# Patient Record
Sex: FEMALE | Race: WHITE | NOT HISPANIC OR LATINO | Employment: OTHER | URBAN - NONMETROPOLITAN AREA
[De-identification: names, ages, dates, MRNs, and addresses within clinical notes are randomized per-mention and may not be internally consistent; named-entity substitution may affect disease eponyms.]

---

## 2023-01-30 ENCOUNTER — OFFICE VISIT (OUTPATIENT)
Dept: CARDIOLOGY | Facility: CLINIC | Age: 58
End: 2023-01-30
Payer: MEDICARE

## 2023-01-30 VITALS
OXYGEN SATURATION: 97 % | SYSTOLIC BLOOD PRESSURE: 118 MMHG | WEIGHT: 156 LBS | DIASTOLIC BLOOD PRESSURE: 76 MMHG | HEIGHT: 63 IN | BODY MASS INDEX: 27.64 KG/M2 | HEART RATE: 93 BPM

## 2023-01-30 DIAGNOSIS — R07.9 CHEST PAIN, UNSPECIFIED TYPE: Primary | ICD-10-CM

## 2023-01-30 DIAGNOSIS — E78.5 DYSLIPIDEMIA: ICD-10-CM

## 2023-01-30 DIAGNOSIS — R06.02 SHORTNESS OF BREATH: ICD-10-CM

## 2023-01-30 PROCEDURE — 99204 OFFICE O/P NEW MOD 45 MIN: CPT | Performed by: PHYSICIAN ASSISTANT

## 2023-01-30 RX ORDER — TIZANIDINE 4 MG/1
TABLET ORAL
COMMUNITY
Start: 2023-01-14

## 2023-01-30 RX ORDER — SIMVASTATIN 40 MG
1 TABLET ORAL DAILY
COMMUNITY
Start: 2023-01-14

## 2023-01-30 RX ORDER — ONDANSETRON 4 MG/1
TABLET, FILM COATED ORAL
COMMUNITY
Start: 2023-01-18

## 2023-01-30 RX ORDER — VALACYCLOVIR HYDROCHLORIDE 500 MG/1
1 TABLET, FILM COATED ORAL 3 TIMES DAILY
COMMUNITY
Start: 2023-01-25

## 2023-01-30 RX ORDER — PANTOPRAZOLE SODIUM 40 MG/1
1 TABLET, DELAYED RELEASE ORAL DAILY
COMMUNITY
Start: 2023-01-14

## 2023-01-30 RX ORDER — OXYCODONE AND ACETAMINOPHEN 10; 325 MG/1; MG/1
TABLET ORAL
COMMUNITY
Start: 2023-01-30

## 2023-01-30 RX ORDER — BENZONATATE 200 MG/1
200 CAPSULE ORAL 3 TIMES DAILY PRN
COMMUNITY
Start: 2022-11-28

## 2023-01-30 RX ORDER — AMOXICILLIN AND CLAVULANATE POTASSIUM 500; 125 MG/1; MG/1
1 TABLET, FILM COATED ORAL EVERY 12 HOURS SCHEDULED
COMMUNITY
Start: 2022-11-28

## 2023-01-30 RX ORDER — PREDNISONE 20 MG/1
1 TABLET ORAL DAILY
COMMUNITY
Start: 2023-01-18

## 2023-01-30 RX ORDER — CYCLOBENZAPRINE HCL 10 MG
1 TABLET ORAL 3 TIMES DAILY
COMMUNITY
Start: 2023-01-18

## 2023-01-30 RX ORDER — LINACLOTIDE 290 UG/1
1 CAPSULE, GELATIN COATED ORAL DAILY
COMMUNITY
Start: 2023-01-14

## 2023-01-30 RX ORDER — NITROGLYCERIN 0.4 MG/1
TABLET SUBLINGUAL
Qty: 25 TABLET | Refills: 11 | Status: SHIPPED | OUTPATIENT
Start: 2023-01-30

## 2023-01-30 NOTE — PROGRESS NOTES
"Problem list     Subjective   Ros Corona is a 57 y.o. female     Chief Complaint   Patient presents with   • Cerebral Palsy     Cp and abn ekg       HPI    Patient is a 57-year-old female who presents to the office to be evaluated.  She has no previous history of coronary disease or structural heart disease    Recently, she has began noticing feeling substernal discomfort.  She describes it feeling as if something is \"sitting on her chest\".  This seems to be brought on by stressful or anxiety situations.  Whenever she feels stressed or becomes very anxious, she will then feel this discomfort.  She does not describe it when exerting or doing activity but mainly during the situations.  She will relax and rest and it resolves.    Patient does not feel severe shortness of breath.  She can become dyspneic if she was to climb a flight of stairs but on a flat surface, she does not describe any severe exertional shortness of breath or any dyspnea at all.    Patient does not describe PND or orthopnea.    She does not describe palpitating nor does she complain of dysrhythmic symptoms.  She otherwise has done well.  She recently went to her PCP and apparently had an abnormal EKG and was referred to have cardiac evaluation.      Current Outpatient Medications on File Prior to Visit   Medication Sig Dispense Refill   • amoxicillin-clavulanate (AUGMENTIN) 500-125 MG per tablet Take 1 tablet by mouth Every 12 (Twelve) Hours.     • benzonatate (TESSALON) 200 MG capsule Take 200 mg by mouth 3 (Three) Times a Day As Needed.     • cyclobenzaprine (FLEXERIL) 10 MG tablet Take 1 tablet by mouth 3 (Three) Times a Day.     • Linzess 290 MCG capsule capsule Take 1 capsule by mouth Daily.     • ondansetron (ZOFRAN) 4 MG tablet TAKE ONE TABLET BY MOUTH EVERY 4-6 HOURS AS NEEDED     • oxyCODONE-acetaminophen (PERCOCET)  MG per tablet      • pantoprazole (PROTONIX) 40 MG EC tablet Take 1 tablet by mouth Daily.     • predniSONE " "(DELTASONE) 20 MG tablet Take 1 tablet by mouth Daily.     • simvastatin (ZOCOR) 40 MG tablet Take 1 tablet by mouth Daily.     • tiZANidine (ZANAFLEX) 4 MG tablet Take 1/2 tablet by mouth three times per day     • valACYclovir (VALTREX) 500 MG tablet Take 1 tablet by mouth 3 (Three) Times a Day.       No current facility-administered medications on file prior to visit.       Phenergan [promethazine] and Tramadol    Past Medical History:   Diagnosis Date   • History of throat surgery        Social History     Socioeconomic History   • Marital status: Single   Tobacco Use   • Smoking status: Never   • Smokeless tobacco: Never   Substance and Sexual Activity   • Alcohol use: Never   • Drug use: Never   • Sexual activity: Defer       History reviewed. No pertinent family history.    Review of Systems   Constitutional: Negative for appetite change, chills and fever.   HENT: Negative for congestion, dental problem, drooling, ear discharge, ear pain, nosebleeds, postnasal drip, rhinorrhea and sinus pain.    Eyes: Negative for pain, itching and visual disturbance.   Respiratory: Positive for shortness of breath. Negative for cough, choking and wheezing.    Cardiovascular: Positive for chest pain, palpitations and leg swelling (feet swell).   Gastrointestinal: Negative for blood in stool, constipation and diarrhea.   Skin: Negative for rash and wound.   Neurological: Negative for dizziness, weakness and numbness.   Psychiatric/Behavioral: Positive for sleep disturbance.       Objective   Vitals:    01/30/23 1544   BP: 118/76   Pulse: 93   SpO2: 97%   Weight: 70.8 kg (156 lb)   Height: 160 cm (63\")      /76   Pulse 93   Ht 160 cm (63\")   Wt 70.8 kg (156 lb)   SpO2 97%   BMI 27.63 kg/m²     Lab Results (most recent)     None          Physical Exam  Vitals and nursing note reviewed.   Constitutional:       General: She is not in acute distress.     Appearance: Normal appearance. She is well-developed.   HENT:      " Head: Normocephalic and atraumatic.   Eyes:      General: No scleral icterus.        Right eye: No discharge.         Left eye: No discharge.      Conjunctiva/sclera: Conjunctivae normal.   Neck:      Vascular: No carotid bruit.   Cardiovascular:      Rate and Rhythm: Normal rate and regular rhythm.      Heart sounds: Normal heart sounds. No murmur heard.    No friction rub. No gallop.   Pulmonary:      Effort: Pulmonary effort is normal. No respiratory distress.      Breath sounds: Normal breath sounds. No wheezing or rales.   Chest:      Chest wall: No tenderness.   Musculoskeletal:      Right lower leg: No edema.      Left lower leg: No edema.   Skin:     General: Skin is warm and dry.      Coloration: Skin is not pale.      Findings: No erythema or rash.   Neurological:      Mental Status: She is alert and oriented to person, place, and time.      Cranial Nerves: No cranial nerve deficit.   Psychiatric:         Behavior: Behavior normal.         Procedure   Procedures       Assessment & Plan     Problems Addressed this Visit        Cardiac and Vasculature    Chest pain - Primary    Relevant Orders    Adult Transthoracic Echo Complete W/ Cont if Necessary Per Protocol    Stress Test With Myocardial Perfusion One Day    Dyslipidemia    Relevant Orders    Adult Transthoracic Echo Complete W/ Cont if Necessary Per Protocol    Stress Test With Myocardial Perfusion One Day       Pulmonary and Pneumonias    Shortness of breath    Relevant Orders    Adult Transthoracic Echo Complete W/ Cont if Necessary Per Protocol    Stress Test With Myocardial Perfusion One Day   Diagnoses       Codes Comments    Chest pain, unspecified type    -  Primary ICD-10-CM: R07.9  ICD-9-CM: 786.50     Shortness of breath     ICD-10-CM: R06.02  ICD-9-CM: 786.05     Dyslipidemia     ICD-10-CM: E78.5  ICD-9-CM: 272.4         Recommendation  1.  Patient is a 57-year-old female who presents to the office to be evaluated with complaints of chest  pain and dyspnea as described above.  I feel cardiac evaluation is warranted in the setting of her symptoms.    2.  I will schedule a nuclear treadmill stress test to evaluate and rule out ischemia as a contributing factor of patient's symptoms.    3.  Echocardiogram will be ordered to evaluate and assess LV systolic and diastolic performance, chamber size, valvular structures etc.    4.  I have prescribed nitroglycerin as needed for chest pain.  I counseled her on how to use that medication.  Any chest pain, not resolved by nitroglycerin, I want her to go to the ER    5.  Otherwise, we will see her back for follow-up on above testing and recommend further.  Follow-up with primary as scheduled.             Ros Corona  reports that she has never smoked. She has never used smokeless tobacco..            Electronically signed by:

## 2023-01-30 NOTE — LETTER
"January 30, 2023     CÉSAR Morgan  [address]  [address]  [place] [address]    Patient: Ros Corona   YOB: 1965   Date of Visit: 1/30/2023       Dear CÉSAR Morgan    Ros Corona was in my office today. Below is a copy of my note.    If you have questions, please do not hesitate to call me. I look forward to following Ros along with you.         Sincerely,        DONNA Marinelli        CC: No Recipients    Problem list     Subjective    Ros Corona is a 57 y.o. female     Chief Complaint   Patient presents with   • Cerebral Palsy     Cp and abn ekg       HPI    Patient is a 57-year-old female who presents to the office to be evaluated.  She has no previous history of coronary disease or structural heart disease    Recently, she has began noticing feeling substernal discomfort.  She describes it feeling as if something is \"sitting on her chest\".  This seems to be brought on by stressful or anxiety situations.  Whenever she feels stressed or becomes very anxious, she will then feel this discomfort.  She does not describe it when exerting or doing activity but mainly during the situations.  She will relax and rest and it resolves.    Patient does not feel severe shortness of breath.  She can become dyspneic if she was to climb a flight of stairs but on a flat surface, she does not describe any severe exertional shortness of breath or any dyspnea at all.    Patient does not describe PND or orthopnea.    She does not describe palpitating nor does she complain of dysrhythmic symptoms.  She otherwise has done well.  She recently went to her PCP and apparently had an abnormal EKG and was referred to have cardiac evaluation.      Current Outpatient Medications on File Prior to Visit   Medication Sig Dispense Refill   • amoxicillin-clavulanate (AUGMENTIN) 500-125 MG per tablet Take 1 tablet by mouth Every 12 (Twelve) Hours.     • benzonatate (TESSALON) 200 MG capsule " Take 200 mg by mouth 3 (Three) Times a Day As Needed.     • cyclobenzaprine (FLEXERIL) 10 MG tablet Take 1 tablet by mouth 3 (Three) Times a Day.     • Linzess 290 MCG capsule capsule Take 1 capsule by mouth Daily.     • ondansetron (ZOFRAN) 4 MG tablet TAKE ONE TABLET BY MOUTH EVERY 4-6 HOURS AS NEEDED     • oxyCODONE-acetaminophen (PERCOCET)  MG per tablet      • pantoprazole (PROTONIX) 40 MG EC tablet Take 1 tablet by mouth Daily.     • predniSONE (DELTASONE) 20 MG tablet Take 1 tablet by mouth Daily.     • simvastatin (ZOCOR) 40 MG tablet Take 1 tablet by mouth Daily.     • tiZANidine (ZANAFLEX) 4 MG tablet Take 1/2 tablet by mouth three times per day     • valACYclovir (VALTREX) 500 MG tablet Take 1 tablet by mouth 3 (Three) Times a Day.       No current facility-administered medications on file prior to visit.       Phenergan [promethazine] and Tramadol    Past Medical History:   Diagnosis Date   • History of throat surgery        Social History     Socioeconomic History   • Marital status: Single   Tobacco Use   • Smoking status: Never   • Smokeless tobacco: Never   Substance and Sexual Activity   • Alcohol use: Never   • Drug use: Never   • Sexual activity: Defer       History reviewed. No pertinent family history.    Review of Systems   Constitutional: Negative for appetite change, chills and fever.   HENT: Negative for congestion, dental problem, drooling, ear discharge, ear pain, nosebleeds, postnasal drip, rhinorrhea and sinus pain.    Eyes: Negative for pain, itching and visual disturbance.   Respiratory: Positive for shortness of breath. Negative for cough, choking and wheezing.    Cardiovascular: Positive for chest pain, palpitations and leg swelling (feet swell).   Gastrointestinal: Negative for blood in stool, constipation and diarrhea.   Skin: Negative for rash and wound.   Neurological: Negative for dizziness, weakness and numbness.   Psychiatric/Behavioral: Positive for sleep disturbance.  "      Objective    Vitals:    01/30/23 1544   BP: 118/76   Pulse: 93   SpO2: 97%   Weight: 70.8 kg (156 lb)   Height: 160 cm (63\")      /76   Pulse 93   Ht 160 cm (63\")   Wt 70.8 kg (156 lb)   SpO2 97%   BMI 27.63 kg/m²     Lab Results (most recent)     None          Physical Exam  Vitals and nursing note reviewed.   Constitutional:       General: She is not in acute distress.     Appearance: Normal appearance. She is well-developed.   HENT:      Head: Normocephalic and atraumatic.   Eyes:      General: No scleral icterus.        Right eye: No discharge.         Left eye: No discharge.      Conjunctiva/sclera: Conjunctivae normal.   Neck:      Vascular: No carotid bruit.   Cardiovascular:      Rate and Rhythm: Normal rate and regular rhythm.      Heart sounds: Normal heart sounds. No murmur heard.    No friction rub. No gallop.   Pulmonary:      Effort: Pulmonary effort is normal. No respiratory distress.      Breath sounds: Normal breath sounds. No wheezing or rales.   Chest:      Chest wall: No tenderness.   Musculoskeletal:      Right lower leg: No edema.      Left lower leg: No edema.   Skin:     General: Skin is warm and dry.      Coloration: Skin is not pale.      Findings: No erythema or rash.   Neurological:      Mental Status: She is alert and oriented to person, place, and time.      Cranial Nerves: No cranial nerve deficit.   Psychiatric:         Behavior: Behavior normal.         Procedure    Procedures      Assessment & Plan     Problems Addressed this Visit        Cardiac and Vasculature    Chest pain - Primary    Relevant Orders    Adult Transthoracic Echo Complete W/ Cont if Necessary Per Protocol    Stress Test With Myocardial Perfusion One Day    Dyslipidemia    Relevant Orders    Adult Transthoracic Echo Complete W/ Cont if Necessary Per Protocol    Stress Test With Myocardial Perfusion One Day       Pulmonary and Pneumonias    Shortness of breath    Relevant Orders    Adult " Transthoracic Echo Complete W/ Cont if Necessary Per Protocol    Stress Test With Myocardial Perfusion One Day   Diagnoses       Codes Comments    Chest pain, unspecified type    -  Primary ICD-10-CM: R07.9  ICD-9-CM: 786.50     Shortness of breath     ICD-10-CM: R06.02  ICD-9-CM: 786.05     Dyslipidemia     ICD-10-CM: E78.5  ICD-9-CM: 272.4         Recommendation  1.  Patient is a 57-year-old female who presents to the office to be evaluated with complaints of chest pain and dyspnea as described above.  I feel cardiac evaluation is warranted in the setting of her symptoms.    2.  I will schedule a nuclear treadmill stress test to evaluate and rule out ischemia as a contributing factor of patient's symptoms.    3.  Echocardiogram will be ordered to evaluate and assess LV systolic and diastolic performance, chamber size, valvular structures etc.    4.  I have prescribed nitroglycerin as needed for chest pain.  I counseled her on how to use that medication.  Any chest pain, not resolved by nitroglycerin, I want her to go to the ER    5.  Otherwise, we will see her back for follow-up on above testing and recommend further.  Follow-up with primary as scheduled.            Ros Corona  reports that she has never smoked. She has never used smokeless tobacco..            Electronically signed by: